# Patient Record
(demographics unavailable — no encounter records)

---

## 2025-05-23 NOTE — HISTORY OF PRESENT ILLNESS
[5] : 5 [Dull/Aching] : dull/aching [3] : 3 [de-identified] : R MF pain and swelling  For 2 years- off/on  Now not getting better for the past 3 weeks   [FreeTextEntry1] : FANNY MARSHALL

## 2025-05-23 NOTE — PHYSICAL EXAM
[de-identified] : R MF: Severe swelling PIP Tender PIP  Stiffness  Todays Xrays PA/Lateral/Oblique of the R MF shows no OA

## 2025-05-23 NOTE — ASSESSMENT
[FreeTextEntry1] : This is a new undiagnosed problem which I have a high suspicion will need surgery for excision mass I rec MRI to evaluate for fluid collection   I recommend the patient take over the counter medication such as Advil/Motrin/Aleve or Tylenol for pain and inflammation  Return after MRI

## 2025-05-23 NOTE — REASON FOR VISIT
[FreeTextEntry2] : 05/23/2025: 38-year-old female here today for: new patient, R hand MF pain. She states she gets flare ups, and the finger becomes hot and swollen. Redness can also show as well. There is a feeling of fluid buildup. It has gotten worse in the last 3 weeks. Denies seeking prior treatment,

## 2025-06-06 NOTE — HISTORY OF PRESENT ILLNESS
[5] : 5 [3] : 3 [Dull/Aching] : dull/aching [de-identified] : R MF pain and swelling  For 2 years- off/on  For the first time I independently reviewed the MRI of the R MF done at [facility] 6/2/25 The clinically relevant findings shows inflammatory arthropathy   [FreeTextEntry1] : R hand/ ROLANDO

## 2025-06-06 NOTE — ASSESSMENT
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 Treatment options for arthritis include OTC NSAIDS, prescription NSAIDS, ice, bracing, OT, cortisone injection, and surgery. Surgical options include arthroplasty and fusion of the arthritic joint.   I recommend the patient take over the counter medication such as Advil/Motrin/Aleve or Tylenol for pain and inflammation  Rheum eval  R MF PIP small joint injection was performed because of pain and inflammation under aseptic conditions with betadine and alcohol Anesthesia: ethyl chloride sprayed topically Celestone 6mg/1cc: An injection of Celestone 1cc Marcaine: An injection of Marcaine 0.5% 1cc 25G needle     The risks, benefits, and alternatives to cortisone injection were explained in full to the patient. Risks outlined include but are not limited to infection, sepsis, bleeding, scarring, skin discoloration, temporary increase in pain, syncopal episode, failure to resolve symptoms, allergic reaction, symptom recurrence, and elevation of blood sugar in diabetics. Patient understood the risks. All questions were answered. After discussion of options, patient verbally consented to an injection. Sterile prep was done of the injection site. Patient tolerated the procedure well. Advised to ice the injection site this evening.

## 2025-06-06 NOTE — REASON FOR VISIT
[FreeTextEntry2] :  06/06/2025:  38 year old female here today for: follow up R MF pain, she had an MRI on 6/2/25 at [facility]

## 2025-06-26 NOTE — HISTORY OF PRESENT ILLNESS
[FreeTextEntry1] : Ms. Pitt is here for evaluation of joint pain  Referred by Haja Plaza  c/o worsening joint pain and swelling   Ms. Pitt notes that as a child (elementary school), she always had pain neck.   there was no swelling in her joints and she was relatively well until each of her pregnancies/childbirths.   developed worsning pain after each one  - notes that currently she has intermittent pain and swelling  - joints vary - one time it will be the right finger, then the left finger -  - also affects the iknee -  - the hands get more swollen than the knee  - it lasts weeks sometimes, sometimes longer - s/p CSI and was recomended to see rheumatology 2/2 MR findings - currently with pain in the wrists and the feingers - not elbows, not shoulders.  knees hurt but do not have the swelling - associated iwth AMS  - episodes worse after childbirth (youngest daughter is 6) - but now worsening in severity and frequnecy over the last 1 - 2 years - AMS - 2-3 hours  - not taking any nsaid - occasional advli  - also worries that stres s i activatin this  - has not had sacroiliits in the past  Fhx: mom PsO, father PsO, sister celiac  Rheum ROS  - denies RP, sicca, oral ulcers, rashes, photosensitivity.    - denies skin tightening, ulcerations, nodules - denies constitutional symptoms, fatigue, night sweats.  weight is stable  - Denies psoriasis, IBD, Inflammatory eye disease, STD, infectious diarrhea - breathes well without h/o of pleuritis, pericarditis.  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

## 2025-06-26 NOTE — PHYSICAL EXAM
[General Appearance - Alert] : alert [General Appearance - In No Acute Distress] : in no acute distress [TextEntry] : CONSTITUTIONAL:  Alert, NAD, well nourished, well developed with normal voice EYES:  Normal white sclera, EOMI, lacrimal glands unremarkable ENT: Normal outer ear/nose, MMM, no oral ulcers NECK: normal appearance, thyroid not enlarged PULMONARY: no respiratory distress, on RA, CTA bilaterally CARDIO:  RRR, S1, S2, no rubs VASCULAR:  no RP, LR, C, C, E LYMPHATICS: no ant/post/supraclavicular nodes BACK: no CVA tenderness, no spinal tenderness, SI NT, FMTP neg MSK: no synovitis, no dactylitis SKIN: no rashes, nodules or tophi.  turgor normal.   no nail pitting NEURO:  MS 5/5 proximally and distally.   No focal defects.   PSYCHE: alert and oriented x3, normal insight/judgement, normal affect. mood okay.  Recent memory intact

## 2025-06-26 NOTE — ASSESSMENT
[FreeTextEntry1] : Ms. 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  mild soft tissue swelling and flexor tenosyvits ventral to the fourth metacarpal, flexor tendinitis ventral to the MCP2 and MPC 5 as well as PIP 4  - given the flexor tenosynovitis across multiple joints and the auotimmunity in the family - this raises suspician for systemic aiCTD / inflammatory arthropathy (c/w pSpA) -- check inflammatory markers  -- check serologies and sub-serologies -- check HLA- B27 -- start diclofenac BID  -- smoking cessation will help with inflammatory arthritis -- continued efforts at peridonital care which can worsen inflammatory arthritis  # new medications  -- diclofenac - is aware to not combine with OTC NSAID  -- d/w patient will likely need treatment for PsA  -- check quant tb in anticipation  -- check hepatitis in anticipation of that  -- AVOID methotrexate 2/2 daily etoh use and h/o increased LFT   ============================================= More than 50% of the encounter was spent counseling the patient on differential, workup, disease course and treatment/management.  Education was provided to the patient during this encounter.  All questions and concerns were addressed and answered.   The patient verbalized understanding and agreed to the plan.   Patient has been instructed to call for an appointment if new symptoms develop. Patient has been instructed to make a follow-up appointment in 1 months.   Time spent on the encounter included, but is not limited to, preparing to see the patient, obtaining and/or reviewing separately obtained history, performing the evaluation, counseling and educating, independently interpreting results with communication to patient, order placement, referring and/or communicating with other health professionals as described, and documenting clinical information in the electronic health record